# Patient Record
Sex: FEMALE | Race: BLACK OR AFRICAN AMERICAN | NOT HISPANIC OR LATINO
[De-identification: names, ages, dates, MRNs, and addresses within clinical notes are randomized per-mention and may not be internally consistent; named-entity substitution may affect disease eponyms.]

---

## 2017-06-15 PROBLEM — Z00.00 ENCOUNTER FOR PREVENTIVE HEALTH EXAMINATION: Noted: 2017-06-15

## 2019-06-24 ENCOUNTER — APPOINTMENT (OUTPATIENT)
Dept: OTOLARYNGOLOGY | Facility: CLINIC | Age: 74
End: 2019-06-24
Payer: MEDICARE

## 2019-06-24 ENCOUNTER — RX RENEWAL (OUTPATIENT)
Age: 74
End: 2019-06-24

## 2019-06-24 VITALS
HEIGHT: 63 IN | WEIGHT: 175 LBS | BODY MASS INDEX: 31.01 KG/M2 | HEART RATE: 80 BPM | DIASTOLIC BLOOD PRESSURE: 78 MMHG | OXYGEN SATURATION: 98 % | SYSTOLIC BLOOD PRESSURE: 158 MMHG

## 2019-06-24 DIAGNOSIS — F41.1 GENERALIZED ANXIETY DISORDER: ICD-10-CM

## 2019-06-24 DIAGNOSIS — M95.2 OTHER ACQUIRED DEFORMITY OF HEAD: ICD-10-CM

## 2019-06-24 PROCEDURE — 99203 OFFICE O/P NEW LOW 30 MIN: CPT

## 2019-06-24 NOTE — PHYSICAL EXAM
[de-identified] : left upper lip symmetric at rest, however facial asymmetry of left nasolabial fold as compared to right present.  partial voluntary movement present. pucker is essentially normal.    with smile, she has asymmetry of the upper lip with the free edge of the left upper lip sitting lower than that of the right.\par normal depressor anguli oris function on left

## 2019-06-24 NOTE — ASSESSMENT
[FreeTextEntry1] : 70 year old female with left upper lip paresis s/p facelift performed at outside institution 11 months ago.  has had some improvement of function, although not complete. \par \par Dr. Mosley had long discussion with patient regarding options.  He recommends a trial of Botox to the opposite (non injured side) to improve symmetry of the upper lip when smiling.  She will be referred to Dr. Sara Carvajal for botox. \par \par If the Botox treatment is insufficient for satisfactory cosmesis, she will be offered surgery to correct the fullness on the left side of the upper lip upon smiling to improve symmetry. This would be discussed further should the patient desire this option. \par \par here today c/o of L 'tightness' and tenderness in temporal region with headache. \par \par PLAN:\par - MRI to r/o neuroma of CN7\par - f/u after

## 2019-06-24 NOTE — HISTORY OF PRESENT ILLNESS
[de-identified] : 69yoF presents for evaluation of left facial nerve weakness. She had a face/neck lift in 11/2014 at an OSH. After the procedure she said the left side of her face was very swollen and they took her back to the OR on POD5 for a wound exploration. She said that no hematoma was found at the time. Since the surgery, she says her face has looked asymmetric. She had an MRI brain w/wo contrast done on 2/18/15 and no e/o pathology was noted in the FN or intracranial per report. 12/11/14 an electrodiagnostic test was performed, revealing an acute left facial mononeuropathy; mostly the branch going to the left orbicularis oris was affected; EMG also supported this; decreased amplitudes noted on review of report. \par   [FreeTextEntry1] : here c/o of tightness, aching and tenderness on L temporal region w L h/a.\par has been following dr jack for botox injections \par duration about 1.5 months. no trismus.

## 2019-06-25 PROBLEM — F41.1 ANXIETY DUE TO INVASIVE PROCEDURE: Status: ACTIVE | Noted: 2019-06-25

## 2019-07-01 ENCOUNTER — OTHER (OUTPATIENT)
Age: 74
End: 2019-07-01

## 2019-07-01 ENCOUNTER — RX RENEWAL (OUTPATIENT)
Age: 74
End: 2019-07-01

## 2019-07-01 RX ORDER — DIAZEPAM 5 MG/1
5 TABLET ORAL
Qty: 1 | Refills: 0 | Status: ACTIVE | OUTPATIENT
Start: 2019-06-25

## 2019-07-01 RX ORDER — DIAZEPAM 5 MG/1
5 TABLET ORAL
Qty: 1 | Refills: 0 | Status: ACTIVE | OUTPATIENT
Start: 2019-07-01

## 2019-07-04 ENCOUNTER — FORM ENCOUNTER (OUTPATIENT)
Age: 74
End: 2019-07-04

## 2019-07-05 ENCOUNTER — APPOINTMENT (OUTPATIENT)
Dept: MRI IMAGING | Facility: HOSPITAL | Age: 74
End: 2019-07-05
Payer: MEDICARE

## 2019-07-05 ENCOUNTER — OUTPATIENT (OUTPATIENT)
Dept: OUTPATIENT SERVICES | Facility: HOSPITAL | Age: 74
LOS: 1 days | End: 2019-07-05
Payer: MEDICARE

## 2019-07-05 PROCEDURE — 70553 MRI BRAIN STEM W/O & W/DYE: CPT

## 2019-07-05 PROCEDURE — A9585: CPT

## 2019-07-05 PROCEDURE — 70553 MRI BRAIN STEM W/O & W/DYE: CPT | Mod: 26

## 2019-09-09 ENCOUNTER — APPOINTMENT (OUTPATIENT)
Dept: OTOLARYNGOLOGY | Facility: CLINIC | Age: 74
End: 2019-09-09
Payer: MEDICARE

## 2019-09-09 VITALS
HEART RATE: 68 BPM | DIASTOLIC BLOOD PRESSURE: 85 MMHG | SYSTOLIC BLOOD PRESSURE: 154 MMHG | OXYGEN SATURATION: 98 % | BODY MASS INDEX: 30.12 KG/M2 | HEIGHT: 63 IN | WEIGHT: 170 LBS

## 2019-09-09 DIAGNOSIS — G51.0 BELL'S PALSY: ICD-10-CM

## 2019-09-09 PROCEDURE — 99211 OFF/OP EST MAY X REQ PHY/QHP: CPT

## 2019-09-09 NOTE — PHYSICAL EXAM
[Normal] : normal appearance, well groomed, well nourished, and in no acute distress [de-identified] : left facial palsy with decreased sensation of left face

## 2019-09-09 NOTE — HISTORY OF PRESENT ILLNESS
[FreeTextEntry1] : MRI 7/5/19 shows no schwannoma, but some chronic inflammation of parotid gland. \par Today's visit- patient c/o persistent left facial palsy, it was discussed with patient that though there is a procedure for harvesting a lower extremity nerve to attempt to reinnervate the left face, it is an extensive procedure and patient conveyed no interest in any more surgeries. We gave the patient the option for cosmetic enhancement to adjust for the imbalance between the two sides of the face and patient decided against this as well.  [de-identified] : 69F w/ PMH of face/neck lift in 2014 @ OSH c/b swelling w/ RTOR for wound exploration, but no hematoma found. Patient has since had facial weakness on the left. Electrodx test in 2014 showed acute left facial mononeuropathy mostly in L orbicularis oris.

## 2025-06-11 NOTE — ASSESSMENT
[FreeTextEntry1] : 74F w/ PMH of face/neck lift in 2014 @ OSH c/b left facial paresis.\par \par Plan:\par - discussed at length the findings of MRI (no visible pathology to explain the paresis)\par - discussed operative and procedural options (including botox), patient disinterested in these options at this time\par - f/u PRN Detail Level: Zone Plan: Recommend SPF 30+ broad spectrum, reapply every 2 hours or after sweating/swimming